# Patient Record
(demographics unavailable — no encounter records)

---

## 2025-02-28 NOTE — HISTORY OF PRESENT ILLNESS
[FreeTextEntry8] : HAVEN ESPINOSA is a 80 year F who presents for acute visit  Rash for the past few weeks, started underneath nose, now also underneath her lower lip States starting using new facial cream and cleanser.  Denies pain, irritation, drainage. Chronic allergic rhinitis, otherwise no other symptoms. No recent travel or sick contacts.

## 2025-02-28 NOTE — PHYSICAL EXAM
[No Acute Distress] : no acute distress [Well-Appearing] : well-appearing [Normal Sclera/Conjunctiva] : normal sclera/conjunctiva [EOMI] : extraocular movements intact [Normal Oropharynx] : the oropharynx was normal [No Respiratory Distress] : no respiratory distress  [No Accessory Muscle Use] : no accessory muscle use [Clear to Auscultation] : lungs were clear to auscultation bilaterally [Normal Rate] : normal rate  [Regular Rhythm] : with a regular rhythm [Normal S1, S2] : normal S1 and S2 [Normal Affect] : the affect was normal [Normal Insight/Judgement] : insight and judgment were intact [de-identified] : murmur [de-identified] : erythematous crusted lesions periorally

## 2025-02-28 NOTE — ASSESSMENT
[FreeTextEntry1] : Perioral dermatitis -recently started new skincare products, advised to stop and switch back to her previous routine -rx given for erythromycin topical -derm if refractory

## 2025-06-04 NOTE — PHYSICAL EXAM
[No Acute Distress] : no acute distress [Well-Appearing] : well-appearing [Normal Sclera/Conjunctiva] : normal sclera/conjunctiva [EOMI] : extraocular movements intact [No Respiratory Distress] : no respiratory distress  [No Accessory Muscle Use] : no accessory muscle use [Clear to Auscultation] : lungs were clear to auscultation bilaterally [Normal Rate] : normal rate  [Regular Rhythm] : with a regular rhythm [Normal S1, S2] : normal S1 and S2 [No Edema] : there was no peripheral edema [No Extremity Clubbing/Cyanosis] : no extremity clubbing/cyanosis [Soft] : abdomen soft [Non Tender] : non-tender [Non-distended] : non-distended [Normal Bowel Sounds] : normal bowel sounds [Normal Affect] : the affect was normal [Normal Insight/Judgement] : insight and judgment were intact [de-identified] : murmur

## 2025-06-04 NOTE — HISTORY OF PRESENT ILLNESS
[FreeTextEntry1] : LESLY [de-identified] : HAVEN ESPINOSA is a 81 year F who presents for AWV PMHx HTN, hypothyroidism, scoliosis  S/p fall last week while walking labradoodle (55lbs), fell forward onto her knees, then braced her fall with both hands. Denies any head trauma or LOC. Was able to stand up on her own. Now with pain on b/l shoulders and back R>L. Has been using infrequent tylenol. Pain improving since fall.   Breast Dr. Jes Ring Gyn Dr. Latasha Samaniego

## 2025-06-04 NOTE — HEALTH RISK ASSESSMENT
[Yes] : Yes [1 or 2 (0 pts)] : 1 or 2 (0 points) [Never (0 pts)] : Never (0 points) [No] : In the past 12 months have you used drugs other than those required for medical reasons? No [0] : 2) Feeling down, depressed, or hopeless: Not at all (0) [Never] : Never [Retired] : retired [] :  [# Of Children ___] : has [unfilled] children [Feels Safe at Home] : Feels safe at home [Fully functional (bathing, dressing, toileting, transferring, walking, feeding)] : Fully functional (bathing, dressing, toileting, transferring, walking, feeding) [Fully functional (using the telephone, shopping, preparing meals, housekeeping, doing laundry, using] : Fully functional and needs no help or supervision to perform IADLs (using the telephone, shopping, preparing meals, housekeeping, doing laundry, using transportation, managing medications and managing finances) [2 - 3 times a week (3 pts)] : 2 - 3  times a week (3 points) [Any fall with injury in past year] : Patient reported fall with injury in the past year [Audit-CScore] : 3 [de-identified] : see hpi  [[id number]] : 0 [MammogramDate] : 2024 [BoneDensityDate] : 05/24 [BoneDensityComments] : osteopenia  [de-identified] : , son (16 years old)

## 2025-06-04 NOTE — HISTORY OF PRESENT ILLNESS
[FreeTextEntry1] : LESLY [de-identified] : HAVEN ESPINOSA is a 81 year F who presents for AWV PMHx HTN, hypothyroidism, scoliosis  S/p fall last week while walking labradoodle (55lbs), fell forward onto her knees, then braced her fall with both hands. Denies any head trauma or LOC. Was able to stand up on her own. Now with pain on b/l shoulders and back R>L. Has been using infrequent tylenol. Pain improving since fall.   Breast Dr. Jes Ring Gyn Dr. Latasha Samaniego

## 2025-06-04 NOTE — HEALTH RISK ASSESSMENT
[Yes] : Yes [1 or 2 (0 pts)] : 1 or 2 (0 points) [Never (0 pts)] : Never (0 points) [No] : In the past 12 months have you used drugs other than those required for medical reasons? No [0] : 2) Feeling down, depressed, or hopeless: Not at all (0) [Never] : Never [Retired] : retired [] :  [# Of Children ___] : has [unfilled] children [Feels Safe at Home] : Feels safe at home [Fully functional (bathing, dressing, toileting, transferring, walking, feeding)] : Fully functional (bathing, dressing, toileting, transferring, walking, feeding) [Fully functional (using the telephone, shopping, preparing meals, housekeeping, doing laundry, using] : Fully functional and needs no help or supervision to perform IADLs (using the telephone, shopping, preparing meals, housekeeping, doing laundry, using transportation, managing medications and managing finances) [2 - 3 times a week (3 pts)] : 2 - 3  times a week (3 points) [Any fall with injury in past year] : Patient reported fall with injury in the past year [Audit-CScore] : 3 [de-identified] : see hpi  [[id number]] : 0 [MammogramDate] : 2024 [BoneDensityDate] : 05/24 [BoneDensityComments] : osteopenia  [de-identified] : , son (16 years old)

## 2025-06-04 NOTE — ASSESSMENT
[Vaccines Reviewed] : Immunizations reviewed today. Please see immunization details in the vaccine log within the immunization flowsheet.  [FreeTextEntry1] : Health Care Maintenance - well visit - routine labs ordered - depression screen negative - ekg SR, unchanged from prior  - mammogram- patient would like to continue screening  - dexa 5/2024 - flu vaccine utd - covid vaccines- reports she is fully vaccinated, including most recent dose  - tdap 2015, unsure if any further doses  - believes she received shingrix and prenvar 20  - advised to get annual eye exams with ophthalmology, skin exams with dermatology, and dental exams - RTC for CPE in 1 year or sooner prn  HTN -bp elevated but improved on repeat -c/w amlodipine 10mg qd and irbesartan 300mg qd  Hypothyroidism -c/w synthroid 112 mcg  Murmur -cardio referral given

## 2025-06-04 NOTE — PHYSICAL EXAM
[No Acute Distress] : no acute distress [Well-Appearing] : well-appearing [Normal Sclera/Conjunctiva] : normal sclera/conjunctiva [EOMI] : extraocular movements intact [No Respiratory Distress] : no respiratory distress  [No Accessory Muscle Use] : no accessory muscle use [Clear to Auscultation] : lungs were clear to auscultation bilaterally [Normal Rate] : normal rate  [Regular Rhythm] : with a regular rhythm [Normal S1, S2] : normal S1 and S2 [No Edema] : there was no peripheral edema [No Extremity Clubbing/Cyanosis] : no extremity clubbing/cyanosis [Soft] : abdomen soft [Non Tender] : non-tender [Non-distended] : non-distended [Normal Bowel Sounds] : normal bowel sounds [Normal Affect] : the affect was normal [Normal Insight/Judgement] : insight and judgment were intact [de-identified] : murmur